# Patient Record
Sex: MALE | Race: AMERICAN INDIAN OR ALASKA NATIVE
[De-identification: names, ages, dates, MRNs, and addresses within clinical notes are randomized per-mention and may not be internally consistent; named-entity substitution may affect disease eponyms.]

---

## 2022-08-08 ENCOUNTER — HOSPITAL ENCOUNTER (OUTPATIENT)
Dept: HOSPITAL 5 - ED | Age: 62
Setting detail: OBSERVATION
LOS: 1 days | Discharge: HOME | End: 2022-08-09
Attending: STUDENT IN AN ORGANIZED HEALTH CARE EDUCATION/TRAINING PROGRAM | Admitting: HOSPITALIST
Payer: COMMERCIAL

## 2022-08-08 DIAGNOSIS — I10: ICD-10-CM

## 2022-08-08 DIAGNOSIS — E11.9: ICD-10-CM

## 2022-08-08 DIAGNOSIS — Z79.899: ICD-10-CM

## 2022-08-08 DIAGNOSIS — Z98.890: ICD-10-CM

## 2022-08-08 DIAGNOSIS — R07.89: ICD-10-CM

## 2022-08-08 DIAGNOSIS — I24.9: Primary | ICD-10-CM

## 2022-08-08 DIAGNOSIS — Z79.82: ICD-10-CM

## 2022-08-08 DIAGNOSIS — I47.1: ICD-10-CM

## 2022-08-08 PROCEDURE — 82962 GLUCOSE BLOOD TEST: CPT

## 2022-08-08 PROCEDURE — G0378 HOSPITAL OBSERVATION PER HR: HCPCS

## 2022-08-08 PROCEDURE — 36415 COLL VENOUS BLD VENIPUNCTURE: CPT

## 2022-08-08 PROCEDURE — 96374 THER/PROPH/DIAG INJ IV PUSH: CPT

## 2022-08-08 PROCEDURE — 80307 DRUG TEST PRSMV CHEM ANLYZR: CPT

## 2022-08-08 PROCEDURE — 84439 ASSAY OF FREE THYROXINE: CPT

## 2022-08-08 PROCEDURE — 85025 COMPLETE CBC W/AUTO DIFF WBC: CPT

## 2022-08-08 PROCEDURE — 84484 ASSAY OF TROPONIN QUANT: CPT

## 2022-08-08 PROCEDURE — 80048 BASIC METABOLIC PNL TOTAL CA: CPT

## 2022-08-08 PROCEDURE — 82550 ASSAY OF CK (CPK): CPT

## 2022-08-08 PROCEDURE — 96376 TX/PRO/DX INJ SAME DRUG ADON: CPT

## 2022-08-08 PROCEDURE — 82553 CREATINE MB FRACTION: CPT

## 2022-08-08 PROCEDURE — 71045 X-RAY EXAM CHEST 1 VIEW: CPT

## 2022-08-08 PROCEDURE — 96372 THER/PROPH/DIAG INJ SC/IM: CPT

## 2022-08-08 PROCEDURE — 83735 ASSAY OF MAGNESIUM: CPT

## 2022-08-08 PROCEDURE — 99285 EMERGENCY DEPT VISIT HI MDM: CPT

## 2022-08-08 PROCEDURE — 84443 ASSAY THYROID STIM HORMONE: CPT

## 2022-08-08 PROCEDURE — 93005 ELECTROCARDIOGRAM TRACING: CPT

## 2022-08-08 NOTE — EMERGENCY DEPARTMENT REPORT
HPI





- General


Time Seen by Provider: 22 23:11





- [facility]


HPI: 





Room 7











The patient is a 62-year-old male present with chief complaint of palpitations 

and chest pain.  Patient states just prior to arrival at 2258 while at rest he 

developed palpitations and squeezing chest pain.  Patient states he did get 

short of breath with the pain but denies nausea/vomiting.  EMS was called and 

arrived to find the patient in SVT.  Patient was administered adenosine 6, 12 

and 12 with no results.  EMS EKG does not show where adenosine took effect.  In 

the ED patient was administered 6 mg of adenosine without effect and then 12 mg 

of adenosine which broke the SVT the patient is now in normal sinus rhythm at 93

bpm.





ED Past Medical Hx





- Past Medical History


Hx Hypertension: Yes


Hx Diabetes: Yes





- Surgical History


Additional Surgical History: Mandible repair





- Family History


Family history: no significant





- Social History


Smoking Status: Unknown if ever smoked


Substance Use Type: None





ED Review of Systems


ROS: 


Stated complaint: CHEST PAIN


Other details as noted in HPI





Constitutional: no symptoms reported


Eyes: denies: eye pain


ENT: denies: throat pain


Respiratory: shortness of breath


Cardiovascular: chest pain, palpitations


Endocrine: no symptoms reported


Gastrointestinal: denies: nausea, vomiting


Genitourinary: denies: dysuria


Musculoskeletal: denies: back pain


Neurological: denies: headache





Physical Exam





- Physical Exam


Physical Exam: 





GENERAL: The patient is well-developed well-nourished male lying on stretcher 

not appearing to be in acute distress. []


HEENT: Normocephalic.  Atraumatic.  Extraocular motions are intact.  Patient has

 moist mucous membranes.


NECK: Supple.  Trachea midline


CHEST/LUNGS: Clear to auscultation.  There is no respiratory distress noted.


HEART/CARDIOVASCULAR: Regular.  There is tachycardia.  There is no gallop rub or

 murmur.


ABDOMEN: Abdomen is soft, nontender.  Patient has normal bowel sounds.  There is

 no abdominal distention.


SKIN: There is no rash.  There is no edema.  There is no diaphoresis.


NEURO: The patient is awake, alert, and oriented.  The patient is cooperative.  

The patient has no focal neurologic deficits.  The patient has normal speech.  

GCS 15


MUSCULOSKELETAL:  There is no evidence of acute injury.





ED Medical Decision Making





- Lab Data


Result diagrams: 


                                 22 23:25





                                 22 23:25





                                Laboratory Tests











  22





  23:25 23:25 23:25


 


WBC  4.9  


 


RBC  3.63 L  


 


Hgb  10.4 L  


 


Hct  32.5 L  


 


MCV  90  


 


MCH  29  


 


MCHC  32  


 


RDW  12.9 L  


 


Plt Count  166  


 


Lymph % (Auto)  25.3  


 


Mono % (Auto)  8.4 H  


 


Eos % (Auto)  2.9  


 


Baso % (Auto)  0.9  


 


Lymph # (Auto)  1.2  


 


Mono # (Auto)  0.4  


 


Eos # (Auto)  0.1  


 


Baso # (Auto)  0.0  


 


Seg Neutrophils %  62.5  


 


Seg Neutrophils #  3.1  


 


Sodium   140 


 


Potassium   3.8 


 


Chloride   104.4 


 


Carbon Dioxide   25 


 


Anion Gap   14 


 


BUN   26 H 


 


Creatinine   1.2 


 


Estimated GFR   > 60 


 


BUN/Creatinine Ratio   22 


 


Glucose   129 H 


 


Calcium   9.2 


 


Magnesium   1.80 


 


Total Creatine Kinase   167 


 


CK-MB (CK-2)   3.9 


 


CK-MB (CK-2) Rel Index   2.3 


 


Troponin T   < 0.010 


 


TSH    2.390


 


Free T4    0.92














- EKG Data


-: EKG Interpreted by Me


Rate: tachycardia (SVT at 176 bpm)





- EKG Data


Interpretation: other (EKG #2 status post adenosine normal sinus at 93 bpm.  No 

ischemic changes seen)





- Radiology Data


Radiology results: report reviewed (Chest x-ray), image reviewed (Chest x-ray)





[facility]  


                                     [address] [place] [address]  


 


                                            XRay Report   


                                               Signed  


 


Patient: ROGER PIRES                                                   

             MR#: [id number]  


[id number]          


: 1960                                                                

Acct:[id number]      


 


Age/Sex: 62 / M                                                                

ADM Date: 22     


 


Loc: ED       


Attending Dr:   


 


 


Ordering Physician: JULIO HARO MD  


Date of Service: 22  


Procedure(s): XR chest 1V ap  


Accession Number(s): [id number]  


 


cc: JULIO HARO MD   


 


Fluoro Time In Minutes:   


 


XR chest 1V ap  


 


 INDICATION / CLINICAL INFORMATION: chest pain/SVT.  


 


 COMPARISON: None available.  


 


 FINDINGS:  


 


 SUPPORT DEVICES: None.  


 HEART /PULMONARY VASCULATURE: Cardiac enlargement with mild pulmonary 

vasculature congestion.   


 LUNGS / PLEURA: No significant pulmonary or pleural abnormality. No 

pneumothorax.   


 


 ADDITIONAL FINDINGS: No significant additional findings.  


 


 IMPRESSION:  


 Cardiac enlargement with mild pulmonary vasculature congestion.  


 


 Signer Name: Ovidio Rudd MD   


 Signed: 2022 12:03 AM  


 Workstation Name: [id number]   


 


 


Transcribed By: JS  


Dictated By: OVIDIO RUDD MD  


Electronically Authenticated By: OVIDIO RUDD MD    


Signed Date/Time: 22                                


 


 


 


DD/DT: 22                                                            

  


TD/TT:





- Differential Diagnosis


PSVT, ACS, A. fib with RVR


Critical care attestation.: 


If time is entered above; I have spent that time in minutes in the direct care 

of this critically ill patient, excluding procedure time.








ED Disposition


Clinical Impression: 


 PSVT (paroxysmal supraventricular tachycardia), Chest pain





Disposition:  ADMITTED AS INPATIENT


Is pt being admited?: Yes


Does the pt Need Aspirin: Yes


Condition: Fair


Instructions:  Nonspecific Chest Pain, Adult


Time of Disposition: 00:24 (Care transferred to hospitalist (Dr. Molina))





Heart Score





- HEART Score


History: Moderately suspicious


EKG: Non-specific


Age: 45-65


Risk factors: 1-2 risk factors


Troponin: < normal limit


HEART Score: 4





- EKG Read Time


Time EKG Completed: 23:40 (.)


EKG Read Time: 23:52

## 2022-08-09 VITALS — DIASTOLIC BLOOD PRESSURE: 80 MMHG | SYSTOLIC BLOOD PRESSURE: 138 MMHG

## 2022-08-09 LAB
BASOPHILS # (AUTO): 0 K/MM3 (ref 0–0.1)
BASOPHILS # (AUTO): 0 K/MM3 (ref 0–0.1)
BASOPHILS NFR BLD AUTO: 0.9 % (ref 0–1.8)
BASOPHILS NFR BLD AUTO: 1 % (ref 0–1.8)
BUN SERPL-MCNC: 23 MG/DL (ref 9–20)
BUN SERPL-MCNC: 26 MG/DL (ref 9–20)
BUN/CREAT SERPL: 22 %
BUN/CREAT SERPL: 23 %
CALCIUM SERPL-MCNC: 9.2 MG/DL (ref 8.4–10.2)
CALCIUM SERPL-MCNC: 9.2 MG/DL (ref 8.4–10.2)
EOSINOPHIL # BLD AUTO: 0.1 K/MM3 (ref 0–0.4)
EOSINOPHIL # BLD AUTO: 0.2 K/MM3 (ref 0–0.4)
EOSINOPHIL NFR BLD AUTO: 2.9 % (ref 0–4.3)
EOSINOPHIL NFR BLD AUTO: 4.2 % (ref 0–4.3)
HCT VFR BLD CALC: 32.5 % (ref 35.5–45.6)
HCT VFR BLD CALC: 33.1 % (ref 35.5–45.6)
HEMOLYSIS INDEX: 21
HEMOLYSIS INDEX: 7
HGB BLD-MCNC: 10.4 GM/DL (ref 11.8–15.2)
HGB BLD-MCNC: 10.6 GM/DL (ref 11.8–15.2)
LYMPHOCYTES # BLD AUTO: 1.2 K/MM3 (ref 1.2–5.4)
LYMPHOCYTES # BLD AUTO: 1.2 K/MM3 (ref 1.2–5.4)
LYMPHOCYTES NFR BLD AUTO: 25.3 % (ref 13.4–35)
LYMPHOCYTES NFR BLD AUTO: 26.1 % (ref 13.4–35)
MCHC RBC AUTO-ENTMCNC: 32 % (ref 32–34)
MCHC RBC AUTO-ENTMCNC: 32 % (ref 32–34)
MCV RBC AUTO: 90 FL (ref 84–94)
MCV RBC AUTO: 90 FL (ref 84–94)
MONOCYTES # (AUTO): 0.4 K/MM3 (ref 0–0.8)
MONOCYTES # (AUTO): 0.4 K/MM3 (ref 0–0.8)
MONOCYTES % (AUTO): 8.3 % (ref 0–7.3)
MONOCYTES % (AUTO): 8.4 % (ref 0–7.3)
PLATELET # BLD: 163 K/MM3 (ref 140–440)
PLATELET # BLD: 166 K/MM3 (ref 140–440)
RBC # BLD AUTO: 3.63 M/MM3 (ref 3.65–5.03)
RBC # BLD AUTO: 3.66 M/MM3 (ref 3.65–5.03)
T4 FREE SERPL-MCNC: 0.92 NG/DL (ref 0.76–1.46)

## 2022-08-09 RX ADMIN — INSULIN LISPRO SCH: 100 INJECTION, SOLUTION INTRAVENOUS; SUBCUTANEOUS at 06:36

## 2022-08-09 RX ADMIN — INSULIN LISPRO SCH UNIT: 100 INJECTION, SOLUTION INTRAVENOUS; SUBCUTANEOUS at 13:55

## 2022-08-09 RX ADMIN — INSULIN LISPRO SCH: 100 INJECTION, SOLUTION INTRAVENOUS; SUBCUTANEOUS at 17:09

## 2022-08-09 NOTE — DISCHARGE SUMMARY
Providers





- Providers


Date of Admission: 


08/09/22 04:27





Date of discharge: 08/09/22


Attending physician: 


TESSY CARRANZA MD





                                        





08/09/22


Consult to Cardiac Rehabilitation [CONS] Routine 


   Reason For Exam: Phase I





08/09/22 04:27


Consult to Cardiology [CONS] Routine 


   Consulting Provider: SPENCER MOROCHO


   Reason For Exam: svt





08/09/22 04:39


Consult to Dietitian/Nutrition [CONS] Routine 


   Physician Instructions: 


   Reason For Exam: 


   Reason for Consult: Diet education











Primary care physician: 


PRIMARY CARE MD








Hospitalization


Reason for admission: chest pain, palpitations


Condition: Fair


Hospital course: 





History of present illness: 





 62-year-old male present with history of hypertension and diabetes was brought 

to the emergency room because of palpitations and chest pain.  Patient states 

that while at rest he developed palpitations and squeezing chest pain.  Patient 

states he did get short of breath with the pain but denies nausea/vomiting.  EMS

 was called and arrived to find the patient in SVT.  Patient was administered 

adenosine 6, 12 and 12 with no results.  EMS EKG does not show where adenosine 

took effect.  In the ED patient was administered 6 mg of adenosine without 

effect and then 12 mg of adenosine which broke the SVT the patient is now in 

normal sinus rhythm at 93 bpm.








In the emergency room initial cardiac enzyme is negative troponin is 0.010, TSH 

2.390 and free T4 0.92.'s were going to admit the patient we will put the 

patient on telemetry.  Will consult cardiology





Hospital course:


SVT resolved, currently normal sinus rhythm.  Patient converted to normal sinus 

rhythm.  Patient was asymptomatic during my encounter.  Cardiology states based 

on their evaluation that the patient can can be safely discharged home.  

Medically cleared from their standpoint for discharge.  They recommended 

prescriptions for aspirin atorvastatin metoprolol on discharge.  Patient can 

follow-up outpatient with cardiology and is advised to get a repeat echo at that

 appointment.





Assessment and Plan:


(1) ACS (acute coronary syndrome)


Current Visit: Yes   Status: Acute   


Plan to address problem: 


Admit the patient to the medical telemetry.  Aspirin 325 mg p.o. daily.  Lipitor

 40 mg p.o. daily.  Nitroglycerin as needed.  Serial cardiac enzymes.  

Echocardiogram.  Cardiology evaluation








(2) SVT (supraventricular tachycardia)


Current Visit: Yes   Status: Acute   


Plan to address problem: 


 Aspirin 325 mg p.o. daily.  Lipitor 40 mg p.o. daily.  Lopressor 25 mg p.o. 

twice daily.  Nitroglycerin as needed.  Serial cardiac enzymes.  Echocardiogram.

  Cardiology evaluation








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


Current Visit: Yes   Status: Acute   


Plan to address problem: 


Humalog sliding scale Accu-Chek every 6 hours with moderate dose coverage.  

Diabetic education








(4) HTN (hypertension)


Current Visit: Yes   Status: Acute   


Plan to address problem: 


Lopressor 25 mg p.o. twice daily.  We will continue the home medication








(5) DVT prophylaxis


Current Visit: Yes   Status: Acute   


Plan to address problem: 


Heparin 5000 units subcu every 12 hours for DVT prophylaxis.  Protonix 40 mg 

p.o. daily for GI prophylaxis.  Patient is a full code








Disposition: 30 STILL A PATIENT


Final Discharge Diagnosis (Prints w/discharge instructions): supraventricular 

tachycardia


Time spent for discharge: 35





Core Measure Documentation





- Palliative Care


Palliative Care/ Comfort Measures: Not Applicable





- Core Measures


Any of the following diagnoses?: none





Exam





- Physical Exam


Narrative exam: 





Physical Exam:


VITAL SIGNS:  Reviewed.    


GENERAL:  The patient appears normally developed, Vital signs as documented.


HEAD:  No signs of head trauma.


EYES:  Pupils are equal.  Extraocular motions intact.  


EARS:  Hearing grossly intact.


MOUTH:  Oropharynx is normal. 


NECK:  No adenopathy, no JVD.  


CHEST:  Chest with clear breath sounds bilaterally.  No wheezes, rales, or 

rhonchi.  


CARDIAC:  Regular rate and rhythm.  S1 and S2, without murmurs, gallops, or rub

s.


VASCULAR:  No Edema.  Peripheral pulses normal and equal in all extremities.


ABDOMEN:  Soft, non tender and non distended.  No   rebound or guarding, and no 

masses palpated.   Bowel Sounds normal.


MUSCULOSKELETAL:  Good range of motion of all major joints. Extremities without 

clubbing, cyanosis or edema.  


NEUROLOGIC EXAM:  Alert and oriented x 4. no focal sensory or strength deficits.

  


PSYCHIATRIC:  Mood normal.


SKIN:  detail exam as documented in skin assessment





- Constitutional


Vitals: 


                                        











Temp Pulse Resp BP Pulse Ox


 


 32.1 F L  73   16   150/83   96 


 


 08/09/22 07:31  08/09/22 07:31  08/09/22 04:58  08/09/22 07:31  08/09/22 11:01














Plan


Follow up with: 


PRIMARY CARE,MD [Primary Care Provider] - 7 Days


Prescriptions: 


AtorvaSTATin [Lipitor] 40 mg PO QHS 30 Days #30 tablet


Aspirin EC [Halfprin EC] 81 mg PO QDAY 30 Days #30 tablet.


Metoprolol [Lopressor TAB] 25 mg PO BID 30 Days #60 tablet

## 2022-08-09 NOTE — CONSULTATION
History of Present Illness


Consult date: 08/09/22


Requesting physician: CASSIE MANCILLA


Consult reason: other (svt)


History of present illness: 





Patient is 62-year-old male with a past medical history of hypertension and 

diabetes who came to the ED due to palpitations which he states developed last 

night.  Patient reports he felt his heart pounding and contacted EMS.  Patient 

was found to be in SVT.  EMS administered adenosine without any effect.  Patient

transferred to the hospital given additional doses of adenosine and converted to

normal sinus rhythm.  Patient denies any complaints of chest pain, shortness of 

breath, nausea, vomiting, lightheadedness.  Patient is previously unknown to our

practice.  Cardiology is consulted for chest pain.





Past History


Past Medical History: diabetes, hypertension


Past Surgical History: Other (Mandible repair)


Social history: no significant social history


Family history: hypertension





Medications and Allergies


                                    Allergies











Allergy/AdvReac Type Severity Reaction Status Date / Time


 


No Known Allergies Allergy   Unverified 12/07/16 10:03











                                Home Medications











 Medication  Instructions  Recorded  Confirmed  Last Taken  Type


 


Aspirin EC [Halfprin EC] 81 mg PO QDAY 30 Days #30 tablet.dr 08/09/22  Unknown 

Rx


 


AtorvaSTATin [Lipitor] 40 mg PO QHS 30 Days #30 tablet 08/09/22  Unknown Rx


 


Metoprolol [Lopressor TAB] 25 mg PO BID 30 Days #60 tablet 08/09/22  Unknown Rx











Active Meds: 


Active Medications





Acetaminophen (Acetaminophen 325 Mg Tab)  650 mg PO Q6H PRN


   PRN Reason: Pain, Mild (1-3)


Aspirin (Aspirin Ec 325 Mg Tab)  325 mg PO QDAY TRENT


Atorvastatin Calcium (Atorvastatin 40 Mg Tab)  40 mg PO QHS TRENT


Dextrose (Dextrose 50% In Water (25gm) 50 Ml Syringe)  50 ml IV Q30MIN PRN; Pr

otocol


   PRN Reason: Hypoglycemia


Heparin Sodium (Porcine) (Heparin 5,000 Unit/1 Ml Vial)  5,000 unit SUB-Q Q12HR 

TRENT


   Last Admin: 08/09/22 10:00 Dose:  5,000 unit


   


Sodium Chloride (Nacl 0.9% 1000 Ml)  1,000 mls @ 100 mls/hr IV AS DIRECT TRENT


Insulin Human Lispro (Insulin Lispro 100 Unit/Ml)  0 unit SUB-Q Q6HR TRENT; 

Protocol


   Last Admin: 08/09/22 13:55 Dose:  2 unit


   


Metoprolol Tartrate (Metoprolol Tartrate 25 Mg Tab)  25 mg PO BID [facility]


   Last Admin: 08/09/22 09:59 Dose:  25 mg


   


Morphine Sulfate (Morphine 4 Mg/1 Ml Inj)  2 mg IV Q5MIN PRN


   PRN Reason: Chest Pain unrelieved by NTG


Nitroglycerin (Nitroglycerin 0.4 Mg Tab Subl)  0.4 mg SL Q5M PRN


   PRN Reason: Chest Pain


Pantoprazole Sodium (Pantoprazole 40 Mg Tab)  40 mg PO QDAY [facility]


   Last Admin: 08/09/22 10:01 Dose:  40 mg


   


Sodium Chloride (Sodium Chloride 0.9% 10 Ml Flush Syringe)  10 ml IV PRN PRN


   PRN Reason: LINE FLUSH


Tramadol HCl (Tramadol 50 Mg Tab)  50 mg PO Q6H PRN


   PRN Reason: Pain, Moderate (4-6)











Review of Systems


Constitutional: no weight loss, no weight gain


Ears, nose, mouth and throat: no sinus pressure, no sinus pain


Cardiovascular: palpitations, rapid/irregular heart beat, high blood pressure, 

no chest pain, no orthopnea, no lightheadedness, no shortness of breath, no 

dyspnea on exertion


Respiratory: no shortness of breath, no dyspnea on exertion


Gastrointestinal: no abdominal pain, no nausea, no vomiting


Musculoskeletal: no neck stiffness, no neck pain


Integumentary: no rash, no pruritis, no redness


Neurological: no head injury, no transient paralysis


Psychiatric: no anxiety, no memory loss


Endocrine: no cold intolerance, no heat intolerance


Hematologic/Lymphatic: no easy bruising, no easy bleeding





Physical Examination


                                   Vital Signs











Pulse Resp BP Pulse Ox


 


 82   16   114/74   97 


 


 08/09/22 04:58  08/09/22 04:58  08/09/22 04:58  08/09/22 04:58











General appearance: no acute distress


HEENT: Positive: PERRL


Neck: Positive: trachea midline


Cardiac: Positive: Reg Rate and Rhythm


Lungs: Positive: Normal Breath Sounds


Neuro: Positive: Grossly Intact


Abdomen: Positive: Soft


Skin: Negative: Rash, Suspicious Lesions, Ulceration


Extremities: Present: upper extr. pulses.  Absent: edema





Results





                                 08/09/22 05:49





                                 08/09/22 05:49


                                 Cardiac Enzymes











  08/08/22 Range/Units





  23:25 


 


CK-MB (CK-2)  3.9  (0.0-4.0)  ng/mL








                                       CBC











  08/08/22 08/09/22 Range/Units





  23:25 05:49 


 


WBC  4.9  4.7  (4.5-11.0)  K/mm3


 


RBC  3.63 L  3.66  (3.65-5.03)  M/mm3


 


Hgb  10.4 L  10.6 L  (11.8-15.2)  gm/dl


 


Hct  32.5 L  33.1 L  (35.5-45.6)  %


 


Plt Count  166  163  (140-440)  K/mm3


 


Lymph # (Auto)  1.2  1.2  (1.2-5.4)  K/mm3


 


Mono # (Auto)  0.4  0.4  (0.0-0.8)  K/mm3


 


Eos # (Auto)  0.1  0.2  (0.0-0.4)  K/mm3


 


Baso # (Auto)  0.0  0.0  (0.0-0.1)  K/mm3








                          Comprehensive Metabolic Panel











  08/08/22 08/09/22 Range/Units





  23:25 05:49 


 


Sodium  140  140  (137-145)  mmol/L


 


Potassium  3.8  3.9  (3.6-5.0)  mmol/L


 


Chloride  104.4  107.3 H  ()  mmol/L


 


Carbon Dioxide  25  26  (22-30)  mmol/L


 


BUN  26 H  23 H  (9-20)  mg/dL


 


Creatinine  1.2  1.0  (0.8-1.3)  mg/dL


 


Glucose  129 H  144 H  ()  mg/dL


 


Calcium  9.2  9.2  (8.4-10.2)  mg/dL














- Imaging and Cardiology


EKG: report reviewed, image reviewed





EKG interpretations





- Telemetry


EKG Rhythm: Sinus Rhythm





- EKG


Sinus rhythms and dysrhythmias: sinus rhythm





Assessment and Plan





Patient is 62-year-old male with a past medical history of hypertension and 

diabetes who came to the ED due to palpitations which he states developed last 

night


SVT


Hypertension


Diabetes


Hyperlipidemia





Plan:


EKG during admission showed SVT rate 176


Repeat EKG shows sinus rhythm 93 no acute ischemic changes.  Patient denies any 

complaints of chest pain


Telemetry reviewed patient sinus 70s to 80s on monitor with no event


Agree with metoprolol 25 mg p.o. twice daily for rate control and atorvastatin 

40 mg p.o. nightly


Patient will be scheduled for outpatient ischemic eval and echo


Cardiac status otherwise stable for discharge





Patient has been scheduled for stress test and echo on 8/31/2022 at 10 AM in our

[place] location


Patient follow-up appointment with Dr. Canada, [facility] heart specialist,

on 9/26/2022 at 1:30 PM in our [place] location.  Phone #[phone]


Patient seen in conjunction with Dr. Canada who agrees this plan of care





- Patient Problems


(1) SVT (supraventricular tachycardia)


Current Visit: Yes   Status: Acute   





(2) Diabetes 1.5, managed as type 2


Current Visit: Yes   Status: Acute   





(3) HTN (hypertension)


Current Visit: Yes   Status: Acute   





(4) PSVT (paroxysmal supraventricular tachycardia)


Current Visit: Yes   Status: Acute

## 2022-08-09 NOTE — HISTORY AND PHYSICAL REPORT
History of Present Illness


Date of examination: 08/09/22


Date of admission: 


08/09/22


Chief complaint: 





Palpitation


Chest pain


History of present illness: 





 62-year-old male present with history of hypertension and diabetes was brought 

to the emergency room because of palpitations and chest pain.  Patient states 

that while at rest he developed palpitations and squeezing chest pain.  Patient 

states he did get short of breath with the pain but denies nausea/vomiting.  EMS

was called and arrived to find the patient in SVT.  Patient was administered 

adenosine 6, 12 and 12 with no results.  EMS EKG does not show where adenosine 

took effect.  In the ED patient was administered 6 mg of adenosine without 

effect and then 12 mg of adenosine which broke the SVT the patient is now in 

normal sinus rhythm at 93 bpm.








In the emergency room initial cardiac enzyme is negative troponin is 0.010, TSH 

2.390 and free T4 0.92.'s were going to admit the patient we will put the 

patient on telemetry.  Will consult cardiology





Past History


Past Medical History: diabetes, hypertension


Past Surgical History: Other (Mandible repair)


Social history: no significant social history


Family history: hypertension





Medications and Allergies


                                    Allergies











Allergy/AdvReac Type Severity Reaction Status Date / Time


 


No Known Allergies Allergy   Unverified 12/07/16 10:03











Active Meds: 


Active Medications





Acetaminophen (Acetaminophen 325 Mg Tab)  650 mg PO Q6H PRN


   PRN Reason: Pain, Mild (1-3)


Aspirin (Aspirin Ec 325 Mg Tab)  325 mg PO QDAY TRENT


Atorvastatin Calcium (Atorvastatin 40 Mg Tab)  40 mg PO QHS [facility]


Heparin Sodium (Porcine) (Heparin 5,000 Unit/1 Ml Vial)  5,000 unit SUB-Q Q12HR 

TRENT


Sodium Chloride (Nacl 0.9% 1000 Ml)  1,000 mls @ 100 mls/hr IV AS DIRECT TRENT


Morphine Sulfate (Morphine 4 Mg/1 Ml Inj)  2 mg IV Q5MIN PRN


   PRN Reason: Chest Pain unrelieved by NTG


Nitroglycerin (Nitroglycerin 0.4 Mg Tab Subl)  0.4 mg SL Q5M PRN


   PRN Reason: Chest Pain


Pantoprazole Sodium (Pantoprazole 40 Mg Tab)  40 mg PO QDAY TRENT


Sodium Chloride (Sodium Chloride 0.9% 10 Ml Flush Syringe)  10 ml IV PRN PRN


   PRN Reason: LINE FLUSH


Tramadol HCl (Tramadol 50 Mg Tab)  50 mg PO Q6H PRN


   PRN Reason: Pain, Moderate (4-6)











Review of Systems


All systems: negative


Cardiovascular: chest pain, palpitations, shortness of breath





Exam





- Constitutional


General appearance: Present: no acute distress, well-nourished





- EENT


Eyes: Present: PERRL


ENT: hearing intact, clear oral mucosa





- Neck


Neck: Present: supple, normal ROM





- Respiratory


Respiratory effort: normal


Respiratory: bilateral: CTA





- Cardiovascular


Heart Sounds: Present: S1 & S2.  Absent: rub, click





- Extremities


Extremities: pulses symmetrical, No edema


Peripheral Pulses: within normal limits





- Abdominal


General gastrointestinal: Present: soft, non-tender, non-distended, normal bowel

sounds


Male genitourinary: Present: normal





- Integumentary


Integumentary: Present: clear, warm, dry





- Musculoskeletal


Musculoskeletal: gait normal, strength equal bilaterally





- Psychiatric


Psychiatric: appropriate mood/affect, intact judgment & insight





- Neurologic


Neurologic: CNII-XII intact, moves all extremities





HEART Score





- HEART Score


EKG: Non-specific


Age: 45-65


Risk factors: 1-2 risk factors


Troponin: 


                                        











Troponin T  < 0.010 ng/mL (0.00-0.029)   08/08/22  23:25    











Troponin: < normal limit





Results





- Labs


CBC & Chem 7: 


                                 08/08/22 23:25





                                 08/08/22 23:25


Labs: 


                             Laboratory Last Values











WBC  4.9 K/mm3 (4.5-11.0)   08/08/22  23:25    


 


RBC  3.63 M/mm3 (3.65-5.03)  L  08/08/22  23:25    


 


Hgb  10.4 gm/dl (11.8-15.2)  L  08/08/22  23:25    


 


Hct  32.5 % (35.5-45.6)  L  08/08/22  23:25    


 


MCV  90 fl (84-94)   08/08/22  23:25    


 


MCH  29 pg (28-32)   08/08/22  23:25    


 


MCHC  32 % (32-34)   08/08/22  23:25    


 


RDW  12.9 % (13.2-15.2)  L  08/08/22  23:25    


 


Plt Count  166 K/mm3 (140-440)   08/08/22  23:25    


 


Lymph % (Auto)  25.3 % (13.4-35.0)   08/08/22  23:25    


 


Mono % (Auto)  8.4 % (0.0-7.3)  H  08/08/22  23:25    


 


Eos % (Auto)  2.9 % (0.0-4.3)   08/08/22  23:25    


 


Baso % (Auto)  0.9 % (0.0-1.8)   08/08/22  23:25    


 


Lymph # (Auto)  1.2 K/mm3 (1.2-5.4)   08/08/22  23:25    


 


Mono # (Auto)  0.4 K/mm3 (0.0-0.8)   08/08/22  23:25    


 


Eos # (Auto)  0.1 K/mm3 (0.0-0.4)   08/08/22  23:25    


 


Baso # (Auto)  0.0 K/mm3 (0.0-0.1)   08/08/22  23:25    


 


Seg Neutrophils %  62.5 % (40.0-70.0)   08/08/22  23:25    


 


Seg Neutrophils #  3.1 K/mm3 (1.8-7.7)   08/08/22  23:25    


 


Sodium  140 mmol/L (137-145)   08/08/22  23:25    


 


Potassium  3.8 mmol/L (3.6-5.0)   08/08/22  23:25    


 


Chloride  104.4 mmol/L ()   08/08/22  23:25    


 


Carbon Dioxide  25 mmol/L (22-30)   08/08/22  23:25    


 


Anion Gap  14 mmol/L  08/08/22  23:25    


 


BUN  26 mg/dL (9-20)  H  08/08/22  23:25    


 


Creatinine  1.2 mg/dL (0.8-1.3)   08/08/22  23:25    


 


Estimated GFR  > 60 ml/min  08/08/22  23:25    


 


BUN/Creatinine Ratio  22 %  08/08/22  23:25    


 


Glucose  129 mg/dL ()  H  08/08/22  23:25    


 


Calcium  9.2 mg/dL (8.4-10.2)   08/08/22  23:25    


 


Magnesium  1.80 mg/dL (1.7-2.3)   08/08/22  23:25    


 


Total Creatine Kinase  167 units/L ()   08/08/22  23:25    


 


CK-MB (CK-2)  3.9 ng/mL (0.0-4.0)   08/08/22  23:25    


 


CK-MB (CK-2) Rel Index  2.3  (0-4)   08/08/22  23:25    


 


Troponin T  < 0.010 ng/mL (0.00-0.029)   08/08/22  23:25    


 


TSH  2.390 mlU/mL (0.270-4.200)   08/08/22  23:25    


 


Free T4  0.92 ng/dL (0.76-1.46)   08/08/22  23:25    














- Imaging and Cardiology


Chest x-ray: report reviewed





Assessment and Plan


VTE prophylaxis?: Chemical


Plan of care discussed with patient/family: Yes





- Patient Problems


(1) ACS (acute coronary syndrome)


Current Visit: Yes   Status: Acute   


Plan to address problem: 


Admit the patient to the medical telemetry.  Aspirin 325 mg p.o. daily.  Lipitor

40 mg p.o. daily.  Nitroglycerin as needed.  Serial cardiac enzymes.  

Echocardiogram.  Cardiology evaluation








(2) SVT (supraventricular tachycardia)


Current Visit: Yes   Status: Acute   


Plan to address problem: 


 Aspirin 325 mg p.o. daily.  Lipitor 40 mg p.o. daily.  Lopressor 25 mg p.o. 

twice daily.  Nitroglycerin as needed.  Serial cardiac enzymes.  Echocardiogram.

 Cardiology evaluation








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


Current Visit: Yes   Status: Acute   


Plan to address problem: 


Humalog sliding scale Accu-Chek every 6 hours with moderate dose coverage.  

Diabetic education








(4) HTN (hypertension)


Current Visit: Yes   Status: Acute   


Plan to address problem: 


Lopressor 25 mg p.o. twice daily.  We will continue the home medication








(5) DVT prophylaxis


Current Visit: Yes   Status: Acute   


Plan to address problem: 


Heparin 5000 units subcu every 12 hours for DVT prophylaxis.  Protonix 40 mg 

p.o. daily for GI prophylaxis.  Patient is a full code

## 2022-08-09 NOTE — XRAY REPORT
XR chest 1V ap



INDICATION / CLINICAL INFORMATION: chest pain/SVT.



COMPARISON: None available.



FINDINGS:



SUPPORT DEVICES: None.

HEART /PULMONARY VASCULATURE: Cardiac enlargement with mild pulmonary vasculature congestion. 

LUNGS / PLEURA: No significant pulmonary or pleural abnormality. No pneumothorax. 



ADDITIONAL FINDINGS: No significant additional findings.



IMPRESSION:

Cardiac enlargement with mild pulmonary vasculature congestion.



Signer Name: Silverio Rudd MD 

Signed: 8/9/2022 12:03 AM

Workstation Name: [id number]-

## 2022-08-10 NOTE — ELECTROCARDIOGRAPH REPORT
[facility]

                                       

Test Date:    2022               Test Time:    23:26:00

Pat Name:     ROGER PIRES           Department:   

Patient ID:   [id number]          Room:         A473 1

Gender:       M                        Technician:   KERRY

:          1960               Requested By: JULIO HARO

Order Number: [id number]             Reading MD:   Darryl Canada

                                 Measurements

Intervals                              Axis          

Rate:         176                      P:            0

IA:                                    QRS:          45

QRSD:         77                       T:            -68

QT:           272                                    

QTc:          466                                    

                           Interpretive Statements

Supraventricular tachycardia

Repolarization abnormality, prob rate related

No previous ECG available for comparison

Electronically Signed On 8- 10:57:32 EDT by Darryl Canada

## 2022-08-10 NOTE — ELECTROCARDIOGRAPH REPORT
[facility]

                                       

Test Date:    2022               Test Time:    23:40:28

Pat Name:     ROGER PIRES           Department:   

Patient ID:   [id number]          Room:         A473 1

Gender:       M                        Technician:   MIGUEL

:          1960               Requested By: JULIO HARO

Order Number: [id number]             Reading MD:   Darryl Canada

                                 Measurements

Intervals                              Axis          

Rate:         93                       P:            58

SC:           217                      QRS:          26

QRSD:         83                       T:            42

QT:           346                                    

QTc:          431                                    

                           Interpretive Statements

Sinus rhythm

Prolonged SC interval

Compared to ECG 2022 23:26:00

First degree AV block now present

Supraventricular tachycardia no longer present

Early repolarization no longer present

Electronically Signed On 8- 10:57:39 EDT by Darryl Canada

## 2022-08-10 NOTE — ELECTROCARDIOGRAPH REPORT
[facility]

                                       

Test Date:    2022               Test Time:    08:02:10

Pat Name:     ROGER PIRES           Department:   

Patient ID:   [id number]          Room:         A473 1

Gender:       M                        Technician:   EHSAN

:          1960               Requested By: CASSIE MANCILLA

Order Number: [id number]             Reading MD:   Darryl Canada

                                 Measurements

Intervals                              Axis          

Rate:         78                       P:            50

MN:           242                      QRS:          25

QRSD:         88                       T:            24

QT:           359                                    

QTc:          410                                    

                           Interpretive Statements

Sinus rhythm

Prolonged MN interval

Compared to ECG 2022 23:40:28

No significant changes

Electronically Signed On 8- 11:04:23 EDT by Darryl Canada